# Patient Record
Sex: FEMALE | URBAN - METROPOLITAN AREA
[De-identification: names, ages, dates, MRNs, and addresses within clinical notes are randomized per-mention and may not be internally consistent; named-entity substitution may affect disease eponyms.]

---

## 2023-02-07 ENCOUNTER — OFFICE (OUTPATIENT)
Dept: URBAN - METROPOLITAN AREA CLINIC 28 | Facility: CLINIC | Age: 63
Setting detail: OPHTHALMOLOGY
End: 2023-02-07
Payer: COMMERCIAL

## 2023-02-07 ENCOUNTER — RX ONLY (RX ONLY)
Age: 63
End: 2023-02-07

## 2023-02-07 DIAGNOSIS — H10.45: ICD-10-CM

## 2023-02-07 DIAGNOSIS — H16.223: ICD-10-CM
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
## 2023-02-07 PROCEDURE — 92002 INTRM OPH EXAM NEW PATIENT: CPT | Performed by: SPECIALIST

## 2023-02-07 PROCEDURE — BRUDER EYE BRUDER EYE PADS: Performed by: SPECIALIST

## 2023-02-07 ASSESSMENT — TONOMETRY
OS_IOP_MMHG: 16
OD_IOP_MMHG: 16

## 2023-02-07 ASSESSMENT — TEAR BREAK UP TIME (TBUT)
OD_TBUT: 5SEC
OS_TBUT: 5SEC

## 2023-02-07 ASSESSMENT — VISUAL ACUITY
OS_BCVA: 20/25+2
OD_BCVA: 20/25+2

## 2023-02-07 ASSESSMENT — LID EXAM ASSESSMENTS
OS_MEIBOMITIS: LLL LUL 2+
OD_MEIBOMITIS: RLL RUL 2+

## 2023-02-07 ASSESSMENT — CONFRONTATIONAL VISUAL FIELD TEST (CVF)
OS_FINDINGS: FULL
OD_FINDINGS: FULL

## 2023-03-10 ENCOUNTER — RX ONLY (RX ONLY)
Age: 63
End: 2023-03-10

## 2023-03-10 ENCOUNTER — OFFICE (OUTPATIENT)
Dept: URBAN - METROPOLITAN AREA CLINIC 28 | Facility: CLINIC | Age: 63
Setting detail: OPHTHALMOLOGY
End: 2023-03-10
Payer: COMMERCIAL

## 2023-03-10 DIAGNOSIS — H16.223: ICD-10-CM

## 2023-03-10 DIAGNOSIS — H10.45: ICD-10-CM

## 2023-03-10 PROCEDURE — 92012 INTRM OPH EXAM EST PATIENT: CPT | Performed by: SPECIALIST

## 2023-03-10 ASSESSMENT — REFRACTION_CURRENTRX
OS_ADD: +2.25
OD_CYLINDER: -0.25
OS_SPHERE: -0.75
OS_OVR_VA: 20/
OS_VPRISM_DIRECTION: PROGS
OD_OVR_VA: 20/
OD_ADD: +2.25
OD_VPRISM_DIRECTION: PROGS
OS_CYLINDER: -0.25
OD_AXIS: 144
OD_SPHERE: -0.75
OS_AXIS: 175

## 2023-03-10 ASSESSMENT — KERATOMETRY
OS_K2POWER_DIOPTERS: 42.50
OD_AXISANGLE_DEGREES: 090
OS_K1POWER_DIOPTERS: 42.25
OD_K2POWER_DIOPTERS: 42.50
OS_AXISANGLE_DEGREES: 086
OD_K1POWER_DIOPTERS: 42.50

## 2023-03-10 ASSESSMENT — CONFRONTATIONAL VISUAL FIELD TEST (CVF)
OD_FINDINGS: FULL
OS_FINDINGS: FULL

## 2023-03-10 ASSESSMENT — AXIALLENGTH_DERIVED
OD_AL: 24.066
OS_AL: 24.1138

## 2023-03-10 ASSESSMENT — REFRACTION_AUTOREFRACTION
OS_SPHERE: 0.00
OS_CYLINDER: -0.50
OS_AXIS: 132
OD_AXIS: 126
OD_CYLINDER: -0.50
OD_SPHERE: 0.00

## 2023-03-10 ASSESSMENT — SPHEQUIV_DERIVED
OD_SPHEQUIV: -0.25
OS_SPHEQUIV: -0.25

## 2023-03-10 ASSESSMENT — VISUAL ACUITY
OS_BCVA: 20/25+2
OD_BCVA: 20/25+2

## 2023-03-10 ASSESSMENT — LID EXAM ASSESSMENTS
OS_MEIBOMITIS: LLL LUL 2+
OD_MEIBOMITIS: RLL RUL 2+

## 2023-03-10 ASSESSMENT — TEAR BREAK UP TIME (TBUT)
OD_TBUT: 7SEC
OS_TBUT: 7SEC

## 2023-03-10 ASSESSMENT — SUPERFICIAL PUNCTATE KERATITIS (SPK)
OS_SPK: 1+
OD_SPK: 1+

## 2024-08-05 ENCOUNTER — NON-APPOINTMENT (OUTPATIENT)
Age: 64
End: 2024-08-05

## 2024-08-06 PROBLEM — Z00.00 ENCOUNTER FOR PREVENTIVE HEALTH EXAMINATION: Status: ACTIVE | Noted: 2024-08-06

## 2024-09-12 PROBLEM — N60.02 CYST OF LEFT BREAST: Status: ACTIVE | Noted: 2024-09-12

## 2024-09-12 NOTE — HISTORY OF PRESENT ILLNESS
[FreeTextEntry1] : 64 year old female was referred by Dr. Donnell Rios who presents for initial evaluation regarding a Left breast cyst ???   Patient had breast imaging at [facility] --- ***** UPLOADED??????????  Denies prior breast surgeries or biopsies.?? Patient denies palpable masses, nipple discharge, skin changes.?? Patient denies family history of breast cancer?? Denies famhx of ovarian cancer.?? Patient has not had genetic testing performed??   [facility]izz Lifetime Risk %??

## 2024-09-12 NOTE — PHYSICAL EXAM
[Supple] : supple [No Supraclavicular Adenopathy] : no supraclavicular adenopathy [Examined in the supine and seated position] : examined in the supine and seated position [No dominant masses] : no dominant masses in right breast  [No Nipple Retraction] : no left nipple retraction [No Nipple Discharge] : no left nipple discharge [No Axillary Lymphadenopathy] : no left axillary lymphadenopathy

## 2024-09-13 ENCOUNTER — NON-APPOINTMENT (OUTPATIENT)
Age: 64
End: 2024-09-13

## 2024-09-17 ENCOUNTER — APPOINTMENT (OUTPATIENT)
Dept: BREAST CENTER | Facility: CLINIC | Age: 64
End: 2024-09-17

## 2024-09-17 DIAGNOSIS — N60.02 SOLITARY CYST OF LEFT BREAST: ICD-10-CM
